# Patient Record
Sex: FEMALE | Race: WHITE | NOT HISPANIC OR LATINO | URBAN - METROPOLITAN AREA
[De-identification: names, ages, dates, MRNs, and addresses within clinical notes are randomized per-mention and may not be internally consistent; named-entity substitution may affect disease eponyms.]

---
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
## 2024-01-29 ENCOUNTER — TELEPHONE (OUTPATIENT)
Dept: OBGYN CLINIC | Facility: CLINIC | Age: 19
End: 2024-01-29

## 2024-02-23 ENCOUNTER — OFFICE VISIT (OUTPATIENT)
Dept: OBGYN CLINIC | Facility: CLINIC | Age: 19
End: 2024-02-23
Payer: COMMERCIAL

## 2024-02-23 VITALS
WEIGHT: 123.8 LBS | BODY MASS INDEX: 23.37 KG/M2 | DIASTOLIC BLOOD PRESSURE: 66 MMHG | HEIGHT: 61 IN | SYSTOLIC BLOOD PRESSURE: 112 MMHG

## 2024-02-23 DIAGNOSIS — Z11.3 SCREEN FOR STD (SEXUALLY TRANSMITTED DISEASE): ICD-10-CM

## 2024-02-23 DIAGNOSIS — N94.2 VAGINISMUS: Primary | ICD-10-CM

## 2024-02-23 PROBLEM — Z30.09 ENCOUNTER FOR COUNSELING REGARDING CONTRACEPTION: Status: RESOLVED | Noted: 2022-01-21 | Resolved: 2024-02-23

## 2024-02-23 PROCEDURE — 87491 CHLMYD TRACH DNA AMP PROBE: CPT | Performed by: PHYSICIAN ASSISTANT

## 2024-02-23 PROCEDURE — 87591 N.GONORRHOEAE DNA AMP PROB: CPT | Performed by: PHYSICIAN ASSISTANT

## 2024-02-23 PROCEDURE — 99214 OFFICE O/P EST MOD 30 MIN: CPT | Performed by: PHYSICIAN ASSISTANT

## 2024-02-23 RX ORDER — MIRTAZAPINE 15 MG/1
TABLET, FILM COATED ORAL
COMMUNITY
Start: 2023-12-18

## 2024-02-23 RX ORDER — SERTRALINE HYDROCHLORIDE 25 MG/1
25 TABLET, FILM COATED ORAL
COMMUNITY
Start: 2023-12-18

## 2024-02-23 NOTE — PROGRESS NOTES
Assessment/Plan:    No problem-specific Assessment & Plan notes found for this encounter.       Diagnoses and all orders for this visit:    Vaginismus  -     Ambulatory Referral to Physical Therapy; Future    Screen for STD (sexually transmitted disease)  -     Chlamydia/GC amplified DNA by PCR    Other orders  -     mirtazapine (REMERON) 15 mg tablet  -     sertraline (ZOLOFT) 25 mg tablet; 25 mg        GC/chlamydia screening done; we will call with results.  Suspect vaginismus.  Referral to pelvic health PT entered; patient given information.  She will call for appointment.  Continue condom use for STD prevention.  F/u in 6 mos for yearly gyn exam.  Call with problems in the interim.    Subjective:      Patient ID: Janey Beck is a 18 y.o. female.    Patient is here with complaint of pain with intercourse.  She first became sexually active a year ago, and it has been consistent since then.  Pain occurs on penetration and lasts for the entirety of intercourse.  She does not feel it is a lubrication issue.  There is no history of sexual trauma.  Periods are regular on her OCP and bleeding lasts for 4-5 days.  Has a history of severe dysmenorrhea that has resolved with OCP use.  Denies pelvic pain.  Patient also denies diarrhea, constipation, and urinary issues including leakage or incomplete bladder emptying.  She and her partner are using condoms; she has never had STD screening.        The following portions of the patient's history were reviewed and updated as appropriate: allergies, current medications, past family history, past medical history, past social history, past surgical history, and problem list.    Review of Systems   Gastrointestinal:  Negative for constipation and diarrhea.   Genitourinary:  Positive for dyspareunia. Negative for difficulty urinating, dysuria, frequency, genital sores, hematuria, menstrual problem, pelvic pain, urgency, vaginal bleeding, vaginal discharge and vaginal pain.      "    Objective:      /66 (BP Location: Left arm, Patient Position: Sitting, Cuff Size: Adult)   Ht 5' 0.5\" (1.537 m)   Wt 56.2 kg (123 lb 12.8 oz)   LMP 02/06/2024 (Approximate)   BMI 23.78 kg/m²          Physical Exam  Vitals reviewed. Exam conducted with a chaperone present.   Constitutional:       Appearance: Normal appearance. She is well-developed and normal weight.   Genitourinary:     General: Normal vulva.      Pubic Area: No rash.       Labia:         Right: No rash, tenderness, lesion or injury.         Left: No rash, tenderness, lesion or injury.       Vagina: Normal. No vaginal discharge, erythema, tenderness or bleeding.      Cervix: Normal.      Uterus: Normal.       Adnexa: Right adnexa normal and left adnexa normal.        Right: No mass, tenderness or fullness.          Left: No mass, tenderness or fullness.     Lymphadenopathy:      Lower Body: No right inguinal adenopathy. No left inguinal adenopathy.   Skin:     General: Skin is warm and dry.   Neurological:      Mental Status: She is alert and oriented to person, place, and time.   Psychiatric:         Mood and Affect: Mood normal.         Behavior: Behavior normal. Behavior is cooperative.         Thought Content: Thought content normal.         Judgment: Judgment normal.           "

## 2024-02-25 LAB
C TRACH DNA SPEC QL NAA+PROBE: NEGATIVE
N GONORRHOEA DNA SPEC QL NAA+PROBE: NEGATIVE

## 2024-03-13 ENCOUNTER — TELEPHONE (OUTPATIENT)
Age: 19
End: 2024-03-13

## 2024-03-13 NOTE — TELEPHONE ENCOUNTER
[facility] had called patient has an appointment with them on 3/18 and they would like the order for physical therapy faxed to them. Fax# [phone]

## 2024-03-15 NOTE — TELEPHONE ENCOUNTER
Patient called and she is going to go to PT out in [place].  She will be going to [facility] and she asked if you could please call them to get their fax number and if you can please fax the script to them. She has an appointment on 3/18/2024. There phone number is [phone].  Thank you

## 2024-03-24 DIAGNOSIS — Z30.41 SURVEILLANCE FOR BIRTH CONTROL, ORAL CONTRACEPTIVES: ICD-10-CM

## 2024-03-25 RX ORDER — DROSPIRENONE AND ETHINYL ESTRADIOL 0.03MG-3MG
1 KIT ORAL DAILY
Qty: 84 TABLET | Refills: 1 | Status: SHIPPED | OUTPATIENT
Start: 2024-03-25

## 2024-04-03 ENCOUNTER — TELEPHONE (OUTPATIENT)
Age: 19
End: 2024-04-03

## 2024-04-03 NOTE — TELEPHONE ENCOUNTER
Carin from [facility] called a plan of care was faxed to office , needs to be signed and faxed back to Fax #[phone]

## 2024-06-17 NOTE — TELEPHONE ENCOUNTER
"      Caitlin Rizvi is a 13 y.o. patient who presents for follow up of   Chief Complaint   Patient presents with    Follow-up    Menorrhagia         13-year-old established patient here for emergency appointment for vaginal bleeding.  She was seen as a new patient in May.  Her hemoglobin was down to 9.2 and she started taking daily iron and was up to 10.1 on 5/9/2024.  Her workup for thyroid and bleeding disorders were normal.  She was started on Janel 1.5/30.  She started about 13 May and then on May 30 started bleeding and bled until June 15th.  She called and we gave her a pill taper, taking 3 active pills for 3 days and then 2 active pills for 2 days and then daily to finish out the pack and skip placebo.  She did not stop bleeding until we did the taper dose.  She does not feel weak or dizzy.  She is on an iron supplement as well as a multivitamin.    The following portions of the patient's history were reviewed and updated as appropriate: allergies, current medications and problem list.    Review of Systems   Constitutional:  Positive for activity change.   Genitourinary:  Positive for menstrual problem.   Neurological:  Negative for dizziness and light-headedness.   Hematological:  Does not bruise/bleed easily.       /68   Ht 152.4 cm (60\")   Wt 49.4 kg (109 lb)   BMI 21.29 kg/m²     Physical Exam  Vitals and nursing note reviewed.   Constitutional:       Appearance: Normal appearance. She is well-developed and normal weight.   HENT:      Head: Normocephalic and atraumatic.   Eyes:      General: No scleral icterus.     Conjunctiva/sclera: Conjunctivae normal.   Neck:      Thyroid: No thyromegaly.   Abdominal:      General: There is no distension.      Palpations: Abdomen is soft. There is no mass.      Tenderness: There is no abdominal tenderness. There is no guarding or rebound.      Hernia: No hernia is present.   Skin:     General: Skin is warm and dry.   Neurological:      Mental Status: She is "
Patient will call back if she wants to cancel this appt, patient aware that we would prefer to see her in office as she is a new patient 
Patients mother LMOM, daughter has an appt on Thursday and was wondering if it can be a virtual visit? Patient is at school and having a hard time getting out of classes 
alert and oriented to person, place, and time.   Psychiatric:         Mood and Affect: Mood normal.         Behavior: Behavior normal.         Thought Content: Thought content normal.         Judgment: Judgment normal.         A/P:  1. AUB- cont OCPS and skip placebo.  2. CS- new refill of pills called in.   3. Anemia- check CBC today.C Continue iron and MVI daily.   4. RTO 7/11/2024 US and visit.     Assessment & Plan   Diagnoses and all orders for this visit:    1. Screening for genitourinary condition (Primary)  -     POC Urinalysis Dipstick  -     POC Pregnancy, Urine    2. Abnormal uterine bleeding (AUB)  -     CBC (No Diff)    3. Visit for oral contraceptive prescription    4. Iron deficiency anemia due to chronic blood loss    Other orders  -     norethindrone-ethinyl estradiol-iron (Junel FE 1.5/30) 1.5-30 MG-MCG tablet; Take 1 tablet by mouth Daily.  Dispense: 84 tablet; Refill: 3                 No follow-ups on file.      Pura Churchill MD    6/17/2024  09:31 EDT  
Passed

## 2024-10-28 DIAGNOSIS — Z30.41 SURVEILLANCE FOR BIRTH CONTROL, ORAL CONTRACEPTIVES: ICD-10-CM

## 2024-10-28 RX ORDER — DROSPIRENONE AND ETHINYL ESTRADIOL 0.03MG-3MG
1 KIT ORAL DAILY
Qty: 84 TABLET | Refills: 1 | Status: SHIPPED | OUTPATIENT
Start: 2024-10-28 | End: 2024-11-04 | Stop reason: SDUPTHER

## 2024-10-28 NOTE — TELEPHONE ENCOUNTER
Medication: drospirenone-ethinyl estradiol (MICHAEL) 3-0.03 MG per tablet     Dose/Frequency: TAKE 1 TABLET DAILY,     Quantity: 84    Pharmacy: [facility] [place] [facility]     Office:   [] PCP/Provider -   [x] Speciality/Provider -     Does the patient have enough for 3 days?   [x] Yes   [] No - Send as HP to POD

## 2024-11-04 DIAGNOSIS — Z30.41 SURVEILLANCE FOR BIRTH CONTROL, ORAL CONTRACEPTIVES: ICD-10-CM

## 2024-11-04 RX ORDER — DROSPIRENONE AND ETHINYL ESTRADIOL 0.03MG-3MG
1 KIT ORAL DAILY
Qty: 84 TABLET | Refills: 1 | Status: SHIPPED | OUTPATIENT
Start: 2024-11-04

## 2024-11-04 NOTE — TELEPHONE ENCOUNTER
**Refill request submitted 10/28 but filled at incorrect pharmacy. Please redirect refill to [facility]    Medication: drospirenone-ethinyl estradiol (MICHAEL) 3-0.03 MG per tablet      Dose/Frequency:  1 tablet, Oral, Daily      Quantity: 84     Pharmacy: [facility] - [place] - [facility]      Office:   [] PCP/Provider -   [x] Speciality/Provider -      Does the patient have enough for 3 days?   [x] Yes   [] No - Send as HP to POD

## 2024-12-13 NOTE — PROGRESS NOTES
Subjective      Janey Beck is a 19 y.o. female who presents for annual GYN exam.     GYN:  Menses are regular, q 28 days, lasting 5 days. Denies intermenstrual bleeding, or spotting.   Denies vaginal discharge, labial erythema or lesions, dyspareunia.  Contraception: pablo.  Patient is sexually active with  partner. Hx of vaginal pain; she did pelvic floor PT      OB:  OB History    Para Term  AB Living   0 0 0 0 0 0   SAB IAB Ectopic Multiple Live Births   0 0 0 0 0         :  Denies dysuria, urinary frequency or urgency.  Denies hematuria, flank pain, incontinence.    Breast:  Denies breast mass, skin changes, dimpling, reddening, nipple retraction.  Denies breast discharge.  Patient does  have a family history of breast, endometrial, colon, or ovarian ca.     Cancer-related family history includes Cancer in her maternal grandmother.    Past Medical History:   Diagnosis Date    Anemia 2004    Depression 2016       History reviewed. No pertinent surgical history.      General:    Work: student at [employer]   Safety: feels safe at home    Social History     Tobacco Use    Smoking status: Never    Smokeless tobacco: Never   Vaping Use    Vaping status: Never Used   Substance Use Topics    Alcohol use: Yes     Alcohol/week: 2.0 standard drinks of alcohol     Types: 2 Glasses of wine per week    Drug use: Never       Screening:  Cervical cancer: last pap smear in Not on file.   Breast cancer: last mammogram in Not on file.   Colon cancer: last colonoscopy in Not on file   STD screening: n/a.    Review of Systems   Constitutional:  Negative for fatigue.   Eyes:  Negative for photophobia and visual disturbance.   Respiratory:  Negative for cough and shortness of breath.    Cardiovascular:  Negative for chest pain and palpitations.   Gastrointestinal:  Negative for abdominal pain, blood in stool, constipation, diarrhea, nausea and rectal pain.   Genitourinary:  Negative for dyspareunia,  "dysuria, flank pain, frequency, genital sores, menstrual problem, pelvic pain, urgency, vaginal bleeding, vaginal discharge and vaginal pain.   Musculoskeletal:  Negative for arthralgias and back pain.   Skin:  Negative for rash.   Neurological:  Negative for weakness and headaches.          Objective      /62 (BP Location: Left arm, Patient Position: Sitting, Cuff Size: Standard)   Ht 5' 0.5\" (1.537 m)   Wt 55.8 kg (123 lb)   LMP 11/26/2024 (Approximate)   BMI 23.63 kg/m²   Physical Exam  Vitals and nursing note reviewed.   Constitutional:       General: She is not in acute distress.     Appearance: Normal appearance.   HENT:      Head: Normocephalic.   Eyes:      Conjunctiva/sclera: Conjunctivae normal.   Cardiovascular:      Rate and Rhythm: Normal rate and regular rhythm.      Heart sounds: Normal heart sounds.   Pulmonary:      Effort: Pulmonary effort is normal.      Breath sounds: Normal breath sounds.   Chest:      Comments: Declines breast exam.  Abdominal:      General: Abdomen is flat.      Palpations: Abdomen is soft.      Tenderness: There is no right CVA tenderness or left CVA tenderness.   Genitourinary:     Comments: Declines pelvic exam  Musculoskeletal:         General: Normal range of motion.      Cervical back: Normal range of motion.      Right lower leg: No edema.      Left lower leg: No edema.   Lymphadenopathy:      Cervical: No cervical adenopathy.   Skin:     General: Skin is warm and dry.   Neurological:      Mental Status: She is alert and oriented to person, place, and time.   Psychiatric:         Mood and Affect: Mood normal.         Behavior: Behavior normal.         Thought Content: Thought content normal.         Judgment: Judgment normal.                 Assessment/Plan  Problem List Items Addressed This Visit    None  Visit Diagnoses         Well woman exam    -  Primary      Surveillance for birth control, oral contraceptives        Relevant Medications    " drospirenone-ethinyl estradiol (MICHAEL) 3-0.03 MG per tablet            Declines GYN concerns today  Birth control: continue VINCENZO  Cervical cancer screening: at age 21  Breast Cancer screening: age 40  Colon cancer Screening: age 45  STD screening: declines  Reviewed healthy lifestyle and safe sex practices  RTO for annual exam or PRN      IMAN Morton  OB/GYN  12/16/2024  1:44 PM

## 2024-12-16 ENCOUNTER — ANNUAL EXAM (OUTPATIENT)
Dept: OBGYN CLINIC | Facility: CLINIC | Age: 19
End: 2024-12-16
Payer: COMMERCIAL

## 2024-12-16 VITALS
HEIGHT: 61 IN | WEIGHT: 123 LBS | SYSTOLIC BLOOD PRESSURE: 110 MMHG | DIASTOLIC BLOOD PRESSURE: 62 MMHG | BODY MASS INDEX: 23.22 KG/M2

## 2024-12-16 DIAGNOSIS — Z01.419 WELL WOMAN EXAM: Primary | ICD-10-CM

## 2024-12-16 DIAGNOSIS — Z30.41 SURVEILLANCE FOR BIRTH CONTROL, ORAL CONTRACEPTIVES: ICD-10-CM

## 2024-12-16 PROCEDURE — S0612 ANNUAL GYNECOLOGICAL EXAMINA: HCPCS

## 2024-12-16 RX ORDER — DROSPIRENONE AND ETHINYL ESTRADIOL 0.03MG-3MG
1 KIT ORAL DAILY
Qty: 84 TABLET | Refills: 3 | Status: SHIPPED | OUTPATIENT
Start: 2024-12-16

## 2025-01-27 NOTE — TELEPHONE ENCOUNTER
Called patient back, she is having pain during intercourse. It has been for the past year. It is every time she has intercourse. Denies bleeding. Scheduled for feb 23rd at 1030 as she preferred a Friday.   
Pt lmom no details left.   
Pt lmom, wanted to schedule an appointment.   
lmom  
Hypothyroid

## 2025-05-12 ENCOUNTER — OFFICE VISIT (OUTPATIENT)
Age: 20
End: 2025-05-12
Payer: COMMERCIAL

## 2025-05-12 VITALS
RESPIRATION RATE: 16 BRPM | WEIGHT: 112.8 LBS | HEART RATE: 102 BPM | HEIGHT: 61 IN | TEMPERATURE: 98.1 F | DIASTOLIC BLOOD PRESSURE: 62 MMHG | OXYGEN SATURATION: 99 % | SYSTOLIC BLOOD PRESSURE: 118 MMHG | BODY MASS INDEX: 21.3 KG/M2

## 2025-05-12 DIAGNOSIS — F41.1 GAD (GENERALIZED ANXIETY DISORDER): ICD-10-CM

## 2025-05-12 DIAGNOSIS — R56.9 WITNESSED SEIZURE-LIKE ACTIVITY (HCC): Primary | ICD-10-CM

## 2025-05-12 DIAGNOSIS — F32.5 MAJOR DEPRESSIVE DISORDER WITH SINGLE EPISODE, IN REMISSION (HCC): ICD-10-CM

## 2025-05-12 PROCEDURE — 99204 OFFICE O/P NEW MOD 45 MIN: CPT | Performed by: INTERNAL MEDICINE

## 2025-05-12 PROCEDURE — 93000 ELECTROCARDIOGRAM COMPLETE: CPT | Performed by: INTERNAL MEDICINE

## 2025-05-12 RX ORDER — DULOXETINE HYDROCHLORIDE 30 MG/1
30 CAPSULE, DELAYED RELEASE ORAL
COMMUNITY
Start: 2025-04-08 | End: 2025-05-22

## 2025-05-12 NOTE — ASSESSMENT & PLAN NOTE
Depression Screening Follow-up Plan: Patient's depression screening was positive with a PHQ-9 score of 5. Continue regular follow-up with their psychologist/therapist/psychiatrist who is managing their mental health condition(s).  Orders:  •  Vitamin D 25 hydroxy; Future  -agree with DC duloxetine  -follow up with Psychiatry

## 2025-05-12 NOTE — PROGRESS NOTES
Name: Janey Beck      : 2005      MRN: [id number]  Encounter Provider: Krystal Piña DO  Encounter Date: 2025   Encounter department: [facility] INTERNAL MEDICINE  :  Assessment & Plan  Witnessed seizure-like activity (HCC)  -brief seizure activity witnessed by her optometrist this morning during her exam, new  -followed by vomiting, recurred.  Advised to remain hydrated and drink broth, gatorade, etc  -rule out metabolic disorder  -obtain CTH  -of note, patient was started on duloxetine one month ago  -[place] form completed, advised no driving at this time  -refer to neuro   Orders:  •  Comprehensive metabolic panel; Future  •  CBC; Future  •  TSH, 3rd generation with Free T4 reflex; Future  •  Urinalysis with microscopic; Future  •  CT head wo contrast; Future  •  Ambulatory Referral to Neurology; Future  •  POCT ECG    Major depressive disorder with single episode, in remission (HCC)  Depression Screening Follow-up Plan: Patient's depression screening was positive with a PHQ-9 score of 5. Continue regular follow-up with their psychologist/therapist/psychiatrist who is managing their mental health condition(s).  Orders:  •  Vitamin D 25 hydroxy; Future  -agree with DC duloxetine  -follow up with Psychiatry  JAM (generalized anxiety disorder)  -DC duloxetine  -follow up with psychiatry              History of Present Illness   HPI  18yo female here as a new patient.  She is accompanied by her mother who provides some of the history.    This morning she went to the eye doctor and had a seizure.  She felt lightheaded and passed out.  Was at the office and eye doctor said she had a seizure.  Hands and arms were shaking, eyes were closed but saw them move back and forth.  Lasted for five seconds.   She woke up leaned over the chair, felt chest tightness and felt as if she could not get enough air in her brain.  She vomited twice and felt better afterwards.  No sick contacts, urinary  "incontinence or tongue biting noted.  She was started on cymbalta on 4/20/25 and was told to DC today.    She ate fruit and slept and rested.  She denies HA, dizziness    She has fainted previously typically at physician offices.  She gets anxious and has passed out.  Typically vomiting does not occur.    LMP two weeks ago.,    Review of Systems   Constitutional:  Negative for appetite change and fever.   HENT:  Negative for congestion, sinus pressure and sinus pain.    Eyes:  Negative for visual disturbance.   Respiratory:  Negative for shortness of breath and wheezing.    Cardiovascular:  Negative for chest pain and palpitations.   Gastrointestinal:  Positive for vomiting. Negative for abdominal pain, constipation, diarrhea and nausea.        Denies reflux   Genitourinary:  Positive for menstrual problem.   Neurological:  Negative for dizziness and headaches.   Psychiatric/Behavioral:  The patient is nervous/anxious.        Current Outpatient Medications:   •  Cymbalta 30 MG delayed release capsule, Take 30 mg by mouth, Disp: , Rfl:   •  drospirenone-ethinyl estradiol (MICHAEL) 3-0.03 MG per tablet, Take 1 tablet by mouth daily, Disp: 84 tablet, Rfl: 3    Objective   /62 (BP Location: Left arm, Patient Position: Sitting, Cuff Size: Standard)   Pulse 102   Temp 98.1 °F (36.7 °C)   Resp 16   Ht 5' 1\" (1.549 m)   Wt 51.2 kg (112 lb 12.8 oz)   SpO2 99%   BMI 21.31 kg/m²      Physical Exam  Vitals reviewed.   Constitutional:       General: She is not in acute distress.  Cardiovascular:      Rate and Rhythm: Regular rhythm. Tachycardia present.      Pulses: Normal pulses.      Heart sounds: Normal heart sounds.   Pulmonary:      Effort: Pulmonary effort is normal. No respiratory distress.      Breath sounds: Normal breath sounds. No wheezing.   Abdominal:      General: Abdomen is flat. Bowel sounds are normal. There is no distension.      Palpations: Abdomen is soft.      Tenderness: There is no abdominal " tenderness.   Musculoskeletal:      Cervical back: Neck supple. No tenderness.      Right lower leg: No edema.      Left lower leg: No edema.   Lymphadenopathy:      Cervical: No cervical adenopathy.   Skin:     Coloration: Skin is not pale.   Neurological:      Mental Status: She is alert and oriented to person, place, and time.      Cranial Nerves: No dysarthria or facial asymmetry.      Motor: No weakness or pronator drift.      Coordination: Heel to Shin Test normal.      Gait: Gait is intact.       EKG: NSR at 72bpm, normal axis, normal EKG.  No old EKG for comparison.

## 2025-05-13 ENCOUNTER — TELEPHONE (OUTPATIENT)
Age: 20
End: 2025-05-13

## 2025-05-13 LAB
25(OH)D3+25(OH)D2 SERPL-MCNC: 23 NG/ML (ref 30–100)
ALBUMIN SERPL-MCNC: 4.7 G/DL (ref 3.5–5.7)
ALP SERPL-CCNC: 49 U/L (ref 35–120)
ALT SERPL-CCNC: 9 U/L
ANION GAP SERPL CALCULATED.3IONS-SCNC: 12 MMOL/L (ref 3–11)
AST SERPL-CCNC: 14 U/L
BACTERIA URNS QL MICRO: ABNORMAL
BILIRUB SERPL-MCNC: 0.4 MG/DL (ref 0.2–1)
BUN SERPL-MCNC: 8 MG/DL (ref 7–25)
CALCIUM SERPL-MCNC: 10 MG/DL (ref 8.5–10.5)
CHLORIDE SERPL-SCNC: 100 MMOL/L (ref 100–109)
CO2 SERPL-SCNC: 26 MMOL/L (ref 21–31)
CREAT SERPL-MCNC: 0.64 MG/DL (ref 0.4–1.1)
CYTOLOGY CMNT CVX/VAG CYTO-IMP: ABNORMAL
ERYTHROCYTE [DISTWIDTH] IN BLOOD BY AUTOMATED COUNT: 13.6 % (ref 12–16)
GFR/BSA.PRED SERPLBLD CYS-BASED-ARV: 129 ML/MIN/{1.73_M2}
GLUCOSE SERPL-MCNC: 83 MG/DL (ref 65–99)
GLUCOSE UR QL STRIP: NEGATIVE MG/DL
HCT VFR BLD AUTO: 39.1 % (ref 35–43)
HGB BLD-MCNC: 13.7 G/DL (ref 11.5–14.5)
HGB UR QL STRIP: NEGATIVE MG/DL
KETONES UR QL STRIP: NEGATIVE MG/DL
LEUKOCYTE ESTERASE UR QL STRIP: 75 /UL
MCH RBC QN AUTO: 31.4 PG (ref 26–34)
MCHC RBC AUTO-ENTMCNC: 35 G/DL (ref 32–37)
MCV RBC AUTO: 90 FL (ref 80–100)
MUCOUS THREADS URNS QL MICRO: ABNORMAL
NITRITE UR QL STRIP: NEGATIVE
PH UR: 6.5 [PH] (ref 4.5–8)
PLATELET # BLD AUTO: 288 THOU/CMM (ref 140–350)
PMV BLD REES-ECKER: 7.3 FL (ref 7.5–11.3)
POTASSIUM SERPL-SCNC: 4.6 MMOL/L (ref 3.5–5.2)
PROT 24H UR-MRATE: NEGATIVE MG/DL
PROT SERPL-MCNC: 7.6 G/DL (ref 6.3–8.3)
RBC # BLD AUTO: 4.35 MILL/CMM (ref 3.7–4.7)
RBC #/AREA URNS HPF: 0 /HPF (ref 0–2)
SL AMB POCT URINE COMMENT: ABNORMAL
SODIUM SERPL-SCNC: 138 MMOL/L (ref 135–145)
SP GR UR: 1.01 (ref 1–1.03)
SQUAMOUS #/AREA URNS HPF: >10 /LPF (ref 0–5)
TSH SERPL-ACNC: 2.97 UIU/ML (ref 0.45–5.33)
WBC # BLD AUTO: 6.7 THOU/CMM (ref 4–10)
WBC #/AREA URNS HPF: 0 /HPF (ref 0–5)

## 2025-05-13 NOTE — TELEPHONE ENCOUNTER
[facility] CT scan of head:     \"No evidence of intracranial hemorrhage or mass.\" Noted that MRI is more sensitive and is recommended for further follow-up.     Please notify provider and further advise patient, as needed.    "

## 2025-05-13 NOTE — TELEPHONE ENCOUNTER
CT head wo contrast (Order [id number])   Fax [phone]    Reason for call:   [x] Prior Auth  [] Other:     Caller: [facility]     Ordering Provider:   [x] PCP/Provider - Dr Piña  [] Speciality/Provider -     Is the patient's insurance updated in EPIC?   [x] Yes   [] No     Is a copy of the patient's insurance scanned in EPIC?   [x] Yes   [] No

## 2025-05-22 ENCOUNTER — OFFICE VISIT (OUTPATIENT)
Age: 20
End: 2025-05-22
Payer: COMMERCIAL

## 2025-05-22 VITALS
OXYGEN SATURATION: 99 % | SYSTOLIC BLOOD PRESSURE: 102 MMHG | HEART RATE: 84 BPM | DIASTOLIC BLOOD PRESSURE: 68 MMHG | BODY MASS INDEX: 21.14 KG/M2 | TEMPERATURE: 97.3 F | HEIGHT: 61 IN | WEIGHT: 112 LBS | RESPIRATION RATE: 16 BRPM

## 2025-05-22 DIAGNOSIS — E55.9 VITAMIN D INSUFFICIENCY: ICD-10-CM

## 2025-05-22 DIAGNOSIS — Z11.4 SCREENING FOR HIV (HUMAN IMMUNODEFICIENCY VIRUS): ICD-10-CM

## 2025-05-22 DIAGNOSIS — Z00.00 ANNUAL PHYSICAL EXAM: Primary | ICD-10-CM

## 2025-05-22 DIAGNOSIS — Z11.59 NEED FOR HEPATITIS C SCREENING TEST: ICD-10-CM

## 2025-05-22 DIAGNOSIS — R56.9 WITNESSED SEIZURE-LIKE ACTIVITY (HCC): ICD-10-CM

## 2025-05-22 PROCEDURE — 99395 PREV VISIT EST AGE 18-39: CPT | Performed by: INTERNAL MEDICINE

## 2025-05-22 RX ORDER — VORTIOXETINE 5 MG/1
TABLET, FILM COATED ORAL
COMMUNITY
Start: 2025-05-17

## 2025-05-22 NOTE — ASSESSMENT & PLAN NOTE
-onset 5/12, brief episode while at optometrist appt, followed by multiple episodes of vomiting   -labs reviewed and unremarkable  -discontinued off cymbalta since  -CTH neg  -will be establishing with Neuro tomorrow  -not currently driving

## 2025-05-22 NOTE — PATIENT INSTRUCTIONS
"Patient Education     Routine physical for adults   The Basics   Written by the doctors and editors at [facility]   What is a physical? -- A physical is a routine visit, or \"check-up,\" with your doctor. You might also hear it called a \"wellness visit\" or \"preventive visit.\"  During each visit, the doctor will:   Ask about your physical and mental health   Ask about your habits, behaviors, and lifestyle   Do an exam   Give you vaccines if needed   Talk to you about any medicines you take   Give advice about your health   Answer your questions  Getting regular check-ups is an important part of taking care of your health. It can help your doctor find and treat any problems you have. But it's also important for preventing health problems.  A routine physical is different from a \"sick visit.\" A sick visit is when you see a doctor because of a health concern or problem. Since physicals are scheduled ahead of time, you can think about what you want to ask the doctor.  How often should I get a physical? -- It depends on your age and health. In general, for people age 21 years and older:   If you are younger than 50 years, you might be able to get a physical every 3 years.   If you are 50 years or older, your doctor might recommend a physical every year.  If you have an ongoing health condition, like diabetes or high blood pressure, your doctor will probably want to see you more often.  What happens during a physical? -- In general, each visit will include:   Physical exam - The doctor or nurse will check your height, weight, heart rate, and blood pressure. They will also look at your eyes and ears. They will ask about how you are feeling and whether you have any symptoms that bother you.   Medicines - It's a good idea to bring a list of all the medicines you take to each doctor visit. Your doctor will talk to you about your medicines and answer any questions. Tell them if you are having any side effects that bother you. You " "should also tell them if you are having trouble paying for any of your medicines.   Habits and behaviors - This includes:   Your diet   Your exercise habits   Whether you smoke, drink alcohol, or use drugs   Whether you are sexually active   Whether you feel safe at home  Your doctor will talk to you about things you can do to improve your health and lower your risk of health problems. They will also offer help and support. For example, if you want to quit smoking, they can give you advice and might prescribe medicines. If you want to improve your diet or get more physical activity, they can help you with this, too.   Lab tests, if needed - The tests you get will depend on your age and situation. For example, your doctor might want to check your:   Cholesterol   Blood sugar   Iron level   Vaccines - The recommended vaccines will depend on your age, health, and what vaccines you already had. Vaccines are very important because they can prevent certain serious or deadly infections.   Discussion of screening - \"Screening\" means checking for diseases or other health problems before they cause symptoms. Your doctor can recommend screening based on your age, risk, and preferences. This might include tests to check for:   Cancer, such as breast, prostate, cervical, ovarian, colorectal, prostate, lung, or skin cancer   Sexually transmitted infections, such as chlamydia and gonorrhea   Mental health conditions like depression and anxiety  Your doctor will talk to you about the different types of screening tests. They can help you decide which screenings to have. They can also explain what the results might mean.   Answering questions - The physical is a good time to ask the doctor or nurse questions about your health. If needed, they can refer you to other doctors or specialists, too.  Adults older than 65 years often need other care, too. As you get older, your doctor will talk to you about:   How to prevent falling at " home   Hearing or vision tests   Memory testing   How to take your medicines safely   Making sure that you have the help and support you need at home  All topics are updated as new evidence becomes available and our peer review process is complete.  This topic retrieved from [employer] on: May 02, 2024.  Topic 179878 Version 1.0  Release: 32.4.3 - C32.122  © 2024 UpToDate, Inc. and/or its affiliates. All rights reserved.  Consumer Information Use and Disclaimer   Disclaimer: This generalized information is a limited summary of diagnosis, treatment, and/or medication information. It is not meant to be comprehensive and should be used as a tool to help the user understand and/or assess potential diagnostic and treatment options. It does NOT include all information about conditions, treatments, medications, side effects, or risks that may apply to a specific patient. It is not intended to be medical advice or a substitute for the medical advice, diagnosis, or treatment of a health care provider based on the health care provider's examination and assessment of a patient's specific and unique circumstances. Patients must speak with a health care provider for complete information about their health, medical questions, and treatment options, including any risks or benefits regarding use of medications. This information does not endorse any treatments or medications as safe, effective, or approved for treating a specific patient. UpToDate, Inc. and its affiliates disclaim any warranty or liability relating to this information or the use thereof.The use of this information is governed by the Terms of Use, available at https://www.woltersNotizzauwer.com/en/know/clinical-effectiveness-terms. 2024© UpToDate, Inc. and its affiliates and/or licensors. All rights reserved.  Copyright   © 2024 UpToDate, Inc. and/or its affiliates. All rights reserved.

## 2025-05-22 NOTE — PROGRESS NOTES
Adult Annual Physical  Name: Janey Beck      : 2005      MRN: [id number]  Encounter Provider: Krystal Piña DO  Encounter Date: 2025   Encounter department: [facility] INTERNAL MEDICINE    :  Assessment & Plan  Annual physical exam         Screening for HIV (human immunodeficiency virus)    Orders:  •  HIV 1/2 AB/AG w Reflex [facility] for 2 yr old and above; Future    Need for hepatitis C screening test    Orders:  •  Hepatitis C antibody; Future    Witnessed seizure-like activity (HCC)  -onset , brief episode while at optometrist appt, followed by multiple episodes of vomiting   -labs reviewed and unremarkable  -discontinued off cymbalta since  -CT neg  -will be establishing with Neuro tomorrow  -not currently driving       Vitamin D insufficiency  -insufficient range at 23  -advised maintenance vitamin D3 at least 2000units daily           Preventive Screenings:  - Diabetes Screening: screening up-to-date  - Cholesterol Screening: screening not indicated   - Chlamydia Screening: screening up-to-date   - Hepatitis C screening: risks/benefits discussed and orders placed   - HIV screening: risks/benefits discussed and orders placed   - Cervical cancer screening: screening not indicated   - Colon cancer screening: screening not indicated   - Lung cancer screening: screening not indicated     Immunizations:  - Immunizations due: Tdap, HPV (Gardasil 9) and will need to obtain immunization records from Pediatrician  - Risks/benefits immunizations discussed    - The patient declines recommended vaccines currently despite my recommendations      Counseling/Anticipatory Guidance:    - Dental health: discussed importance of regular tooth brushing, flossing, and dental visits.   - Diet: discussed recommendations for a healthy/well-balanced diet.   - Exercise: the importance of regular exercise/physical activity was discussed. Recommend exercise 3-5 times per week for at least 30 minutes.  "         History of Present Illness     Adult Annual Physical:  Patient presents for annual physical. She was discontinued off cymbalta on 5/13, the day she had a seizure like activity.  Then started on trintellix 5mg four days later.  She reports no recurrence of seizure activity..     Diet and Physical Activity:  - Diet/Nutrition:. vegetarian since age of 7  - Exercise: walking.    General Health:  - Sleep: sleeps well.  - Vision: wears glasses and most recent eye exam < 1 year ago.  - Dental: regular dental visits.    /GYN Health:  - Follows with GYN: yes.   - Menopause: premenopausal.   - Contraception: oral contraceptives.      Advanced Care Planning:  - Has an advanced directive?: no    - Has a durable medical POA?: no      Review of Systems   Constitutional:  Negative for activity change and appetite change.   HENT:  Negative for congestion, postnasal drip and rhinorrhea.    Respiratory:  Negative for cough, shortness of breath and wheezing.    Cardiovascular:  Positive for palpitations. Negative for chest pain and leg swelling.   Gastrointestinal:  Negative for abdominal pain, constipation, diarrhea, nausea and vomiting.        Denies reflux   Genitourinary:  Negative for difficulty urinating.   Musculoskeletal:  Negative for arthralgias.   Neurological:  Negative for dizziness, tremors, weakness, numbness and headaches.   Psychiatric/Behavioral:  Negative for sleep disturbance. The patient is nervous/anxious.        Medical History Reviewed by provider this encounter:  Allergies     .  Medications Ordered Prior to Encounter[1]   Social History[2]    Objective   /68 (BP Location: Left arm, Patient Position: Sitting, Cuff Size: Standard)   Pulse 84   Temp (!) 97.3 °F (36.3 °C) (Tympanic Core)   Resp 16   Ht 5' 1\" (1.549 m)   Wt 50.8 kg (112 lb)   SpO2 99%   BMI 21.16 kg/m²     Physical Exam  Vitals reviewed.   Constitutional:       General: She is not in acute distress.     Appearance: She is " normal weight.   HENT:      Head: Normocephalic.      Right Ear: Tympanic membrane and ear canal normal.      Left Ear: Tympanic membrane and ear canal normal.      Nose: Nose normal.      Mouth/Throat:      Mouth: Mucous membranes are moist.     Eyes:      Extraocular Movements: Extraocular movements intact.      Conjunctiva/sclera: Conjunctivae normal.      Pupils: Pupils are equal, round, and reactive to light.      Comments: Wears glasses   Neck:      Thyroid: No thyromegaly or thyroid tenderness.     Cardiovascular:      Rate and Rhythm: Normal rate and regular rhythm.      Pulses: Normal pulses.      Heart sounds: Normal heart sounds.   Pulmonary:      Effort: Pulmonary effort is normal. No respiratory distress.      Breath sounds: Normal breath sounds. No wheezing.   Abdominal:      General: There is no distension.      Palpations: Abdomen is soft. There is no mass.      Tenderness: There is no abdominal tenderness.     Musculoskeletal:      Cervical back: Neck supple. No tenderness.      Right lower leg: No edema.      Left lower leg: No edema.   Lymphadenopathy:      Cervical: No cervical adenopathy.     Skin:     Coloration: Skin is not pale.     Neurological:      General: No focal deficit present.      Mental Status: She is alert and oriented to person, place, and time.     Psychiatric:         Mood and Affect: Mood normal.         Recent Results (from the past 3 weeks)   Comprehensive metabolic panel    Collection Time: 05/13/25 10:47 AM   Result Value Ref Range    Glucose, Random 83 65 - 99 mg/dL    BUN 8 7 - 25 mg/dL    Creatinine 0.64 0.40 - 1.10 mg/dL    Sodium 138 135 - 145 mmol/L    Potassium 4.6 3.5 - 5.2 mmol/L    Chloride 100 100 - 109 mmol/L    CO2 26 21 - 31 mmol/L    Calcium 10.0 8.5 - 10.5 mg/dL    Alkaline Phosphatase 49 35 - 120 U/L    Albumin 4.7 3.5 - 5.7 g/dL    TOTAL BILIRUBIN 0.4 0.2 - 1.0 mg/dL    Protein, Total 7.6 6.3 - 8.3 g/dL    AST 14 <41 U/L    ALT 9 <56 U/L    ANION GAP 12  (H) 3 - 11    eGFR 129 >59    Comment Interpretive information: calculated GFR    TSH, 3rd generation with Free T4 reflex    Collection Time: 05/13/25 10:47 AM   Result Value Ref Range    TSH 2.97 0.45 - 5.33 uIU/mL   CBC    Collection Time: 05/13/25 10:47 AM   Result Value Ref Range    Hemoglobin 13.7 11.5 - 14.5 g/dL    HCT 39.1 35.0 - 43.0 %    White Blood Cell Count 6.7 4.0 - 10.0 thou/cmm    Red Blood Cell Count 4.35 3.70 - 4.70 mill/cmm    Platelet Count 288 140 - 350 thou/cmm    SL AMB MPV 7.3 (L) 7.5 - 11.3 fL    MCV 90 80 - 100 fL    MCH 31.4 26.0 - 34.0 pg    MCHC 35.0 32.0 - 37.0 g/dL    RDW 13.6 12.0 - 16.0 %   Urinalysis with microscopic    Collection Time: 05/13/25 10:47 AM   Result Value Ref Range    Specific Gravity Ur 1.006 1.003 - 1.030    pH, Urine 6.5 4.5 - 8.0    Protein, Urine Negative Negative mg/dL    Glucose, Urine Negative Negative mg/dL    Ketone, Urine Negative Negative mg/dL    Blood, Urine Negative Negative mg/dL    Leukocyte Esterase 75 (A) Negative /uL    Nitrites Urine Negative Negative    POCT URINE COMMENT Not applicable     RBC, Urine 0 0 - 2 /hpf    SL AMB WBC, URINE 0 0 - 5 /hpf    Bacteria, Urine Few (A) Negative    MUCUS THREADS Few     Squamous Epithelial Cells >10 0 - 5 /lpf   Vitamin D 25 hydroxy    Collection Time: 05/13/25 10:47 AM   Result Value Ref Range    25-HYDROXY VIT D 23 (L) 30 - 100 ng/mL                [1]  Current Outpatient Medications on File Prior to Visit   Medication Sig Dispense Refill   • drospirenone-ethinyl estradiol (MICHAEL) 3-0.03 MG per tablet Take 1 tablet by mouth daily 84 tablet 3   • Trintellix 5 MG tablet      • [DISCONTINUED] Cymbalta 30 MG delayed release capsule Take 30 mg by mouth       No current facility-administered medications on file prior to visit.   [2]  Social History  Tobacco Use   • Smoking status: Never   • Smokeless tobacco: Never   Vaping Use   • Vaping status: Never Used   Substance and Sexual Activity   • Alcohol use: Yes      Alcohol/week: 2.0 standard drinks of alcohol     Types: 2 Glasses of wine per week   • Drug use: Never   • Sexual activity: Yes     Partners: Male     Birth control/protection: Coitus interruptus, Condom Male, OCP

## 2025-05-23 ENCOUNTER — CONSULT (OUTPATIENT)
Dept: NEUROLOGY | Facility: CLINIC | Age: 20
End: 2025-05-23
Attending: INTERNAL MEDICINE
Payer: COMMERCIAL

## 2025-05-23 VITALS
OXYGEN SATURATION: 99 % | BODY MASS INDEX: 21.3 KG/M2 | HEIGHT: 61 IN | HEART RATE: 90 BPM | SYSTOLIC BLOOD PRESSURE: 108 MMHG | DIASTOLIC BLOOD PRESSURE: 72 MMHG | WEIGHT: 112.8 LBS | TEMPERATURE: 95.9 F

## 2025-05-23 DIAGNOSIS — R56.9 WITNESSED SEIZURE-LIKE ACTIVITY (HCC): Primary | ICD-10-CM

## 2025-05-23 DIAGNOSIS — R55 SYNCOPE: ICD-10-CM

## 2025-05-23 PROCEDURE — 99204 OFFICE O/P NEW MOD 45 MIN: CPT | Performed by: PHYSICIAN ASSISTANT

## 2025-05-23 NOTE — ASSESSMENT & PLAN NOTE
"Ms. Beck is a 19 year old female with history of anxiety, depression and prior episodes of vasovagal syncope in the setting of medical office visits and procedures, who recently had an event of syncope with brief convulsive activity on 5/12/25 while in the middle of an eye exam at her optometrist's office.  Per report, she felt lightheaded and woozy like she was going to pass out, then briefly lost consciousness with witnessed shaking of her upper extremities x 5-10 seconds.  There was no tongue bite, incontinence, or prolonged postictal state.  She vomited afterwards.  PCP saw her later that day and ordered a stat CT head which was unremarkable.  Her neurologic exam is unremarkable.    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  The convulsive activity was only in her upper extremities and very brief, and there were no other concerns for seizure such as tongue injury, incontinence or postictal state.  However, she does have a family history of seizures, including in her father, which seems to have been diagnosed in his teens (patient reports he has been off AEDs for several years now with no recurrence).    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  Patient is in agreement.  There is no indication to initiate AED at this time.    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  She asked if this was \"okay\", and I told her that while it is not ideal (would prefer that she get the workup done now), " there really is nothing we can do about it since she is leaving in 3 days.  I suggested that she let her new roommates know about the recent event and ask them to keep an eye on her.  We discussed that if there are any events concerning for seizure, or any episodes of loss of consciousness, she should present to the emergency room where she is for full evaluation, and she is in complete agreement.      We did review seizure precautions today.  Her PCP sent a seizure reporting form to [facility] when she was seen, although patient has not received anything from them yet.  [facility] and should comply with no driving, at least until the workup is completed.    Plan:  - There is no indication to initiate antiseizure medication at this time  - MRI brain seizure protocol with and without contrast  - Routine EEG followed by 24-hour ambulatory EEG, if the routine is normal  - Patient to continue to not drive at this time, at least until the workup has been completed.  PCP already sent in [facility] form  - Patient should present to the ED if there are any further episodes of LOC or witnessed seizure-like activity  - Follow-up after additional workup has been completed    Orders:    EEG Routine and awake; Future    Ambulatory EEG 24 Hours; Standing    MRI brain seizure wo and w contrast; Future

## 2025-05-23 NOTE — PROGRESS NOTES
Name: Janey Beck      : 2005      MRN: [id number]  Encounter Provider: Gema Lopez PA-C  Encounter Date: 2025   Encounter department: [facility]  :  Assessment & Plan  Witnessed seizure-like activity (HCC)  Ms. Beck is a 19 year old female with history of anxiety, depression and prior episodes of vasovagal syncope in the setting of medical office visits and procedures, who recently had an event of syncope with brief convulsive activity on 25 while in the middle of an eye exam at her optometrist's office.  Per report, she felt lightheaded and woozy like she was going to pass out, then briefly lost consciousness with witnessed shaking of her upper extremities x 5-10 seconds.  There was no tongue bite, incontinence, or prolonged postictal state.  She vomited afterwards.  PCP saw her later that day and ordered a stat CT head which was unremarkable.  Her neurologic exam is unremarkable.    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  The convulsive activity was only in her upper extremities and very brief, and there were no other concerns for seizure such as tongue injury, incontinence or postictal state.  However, she does have a family history of seizures, including in her father, which seems to have been diagnosed in his teens (patient reports he has been off AEDs for several years now with no recurrence).    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  Patient is in agreement.  There is no indication to initiate AED at this time.    Unfortunately, patient is leaving on Monday  "(5/26/2025) for an internship in [place], and will be unable to complete any of her workup until she returns to [place] in August 2025.  She asked if this was \"okay\", and I told her that while it is not ideal (would prefer that she get the workup done now), there really is nothing we can do about it since she is leaving in 3 days.  I suggested that she let her new roommates know about the recent event and ask them to keep an eye on her.  We discussed that if there are any events concerning for seizure, or any episodes of loss of consciousness, she should present to the emergency room where she is for full evaluation, and she is in complete agreement.      We did review seizure precautions today.  Her PCP sent a seizure reporting form to [facility] when she was seen, although patient has not received anything from them yet.  She should wait to hear from [facility] and should comply with no driving, at least until the workup is completed.    Plan:  - There is no indication to initiate antiseizure medication at this time  - MRI brain seizure protocol with and without contrast  - Routine EEG followed by 24-hour ambulatory EEG, if the routine is normal  - Patient to continue to not drive at this time, at least until the workup has been completed.  PCP already sent in [employer] form  - Patient should present to the ED if there are any further episodes of LOC or witnessed seizure-like activity  - Follow-up after additional workup has been completed    Orders:    EEG Routine and awake; Future    Ambulatory EEG 24 Hours; Standing    MRI brain seizure wo and w contrast; Future    Syncope  As above, event more likely to have been vasovagal convulsive syncope, but will complete full seizure workup              History of Present Illness   HPI   Janey Beck is a 19 y.o. female with PMH of anxiety, depression, vasovagal syncope in the setting of medical procedures and office visits, who presents today for syncope with witnessed " "convulsive activity.    Patient referred by PCP for \"seizure like activity\" which occurred on 5/12/25.    Patient reports that she was at her eye doctor that day for a routine visit.  They were examining her and she started to feel woozy, lightheaded, and like she was going to pass out, and did.  Her eye doctor told her she passed out for about a min, and for 5-10 seconds had witnessed shaking of her hands and arms.  Also noted that her eyes were \"moving back and forth\".  When she came to, she felt like she had to vomit, and did x 2.  She says she felt better after she vomited.  She recalls brief confusion for less than 3 min, then back to baseline.  There was no incontinence, tongue biting.  She went to her PCP later that day and says she also vomited there.  She believes she had a banana and iced tea for breakfast and her appt was around noon when this happened.  She has a long history of vasovagal syncope anytime she gets shots, blood drawn, or even just at physician offices.  She tells me she once passed out in the hallway at her OBGYN office.   She has also passed out when ill before.  This is usually associated with anxiety and was not anxious that day.  She has felt fine since this incident.  She notes she had been started on Cymbalta by her psychiatrist about 2 weeks prior to this and then her psychiatrist took her off it when they learned of this event.  Her PCP ordered a STAT CTH, which she completed at [facility] on 5/13/25 and this was read as normal.  She was told not to drive and PCP apparently reported to [facility].    Patient denies history of seizures, just syncope as above.  She does have a family history of seizures in her father, who apparently had seizures diagnosed before age 18 and was on anti-seizure meds until 4-5 years ago when he stopped on his own and has not had any seizures since then.  Also apparently has a maternal aunt with seizures.  No prior head injuries, brain infections.  Normal birth " and development. No drug or alcohol use (social alcohol occasionally).    Patient reports that on  she is moving to [place] for the summer for an internship.  She says she will be unable to complete any work up until she is back in [place] in August.    AED/side effects/compliance:  None      Event/Seizure semiology:  - at optometry office; felt woozy, lightheaded like she was going to pass out.  Had LOC for less than 1 min with brief (5-10 sec) of shaking in arms witnessed by her eye doctor.  Returned to baseline after very brief 1-2 min of confusion.  No prolonged postictal state, tongue bite, incontinence.  Vomiting afterwards and later that day.   - history of syncope in the setting of anxiety, blood draws, shots, medical office visits, illness      Special Features  Status epilepticus: No  Self Injury Seizures: No  Precipitating Factors: No     Epilepsy Risk Factors:  Abnormal pregnancy: No  Abnormal birth/: No  Abnormal Development: No  Febrile seizures, simple: No  Febrile seizures, complex: No  CNS infection: No  Cerebral palsy: No  Head injury (moderate/severe): No  CNS neoplasm: No  CNS malformation: No  Neurosurgical procedure: No  Stroke: No  Alcohol abuse: No  Drug abuse: No  Family history Sz/epilepsy: Dad had seizures when he was younger, but is now off medication.  Possibly diagnosed prior to age 18.  Stopped meds 4-5 years ago and nothing since.  Maternal Aunt has history of seizures when she was younger, may or may not still be on medication     Prior AEDs:  None      Prior workup:    CTH wo contrast 25  Normal     Past psychiatric history:  Anxiety, depression  History of inpatient behavioral health admission  Psychiatrist is Dr. Imani Terrell [facility]  Sees a therapist too     Social history:  Lives with: parents, also lives out at college during school year  Occupation: student at [employer], studying electro [employer]  Driving: not currently, reporting form sent to  [employer] by PCP, awaiting letter   Alcohol socially, no drugs     Review of Systems   Constitutional: Negative.  Negative for fatigue and fever.   HENT: Negative.  Negative for hearing loss, tinnitus and trouble swallowing.    Eyes:  Negative for photophobia, pain and visual disturbance.   Respiratory: Negative.  Negative for cough and shortness of breath.    Cardiovascular: Negative.  Negative for chest pain and palpitations.   Gastrointestinal:  Negative for constipation, diarrhea, nausea and vomiting.   Endocrine: Negative.    Genitourinary: Negative.  Negative for difficulty urinating and urgency.   Musculoskeletal: Negative.  Negative for back pain, gait problem and neck pain.   Skin: Negative.  Negative for rash.   Neurological:  Positive for syncope. Negative for dizziness, tremors, seizures, speech difficulty, weakness, numbness and headaches.   Hematological: Negative.    Psychiatric/Behavioral:  Negative for decreased concentration and sleep disturbance. The patient is not nervous/anxious.     I have personally reviewed the MA's review of systems and made changes as necessary.    Medications Ordered Prior to Encounter[1]      Objective   /72 (Patient Position: Sitting, Cuff Size: Standard)   Pulse 90   Temp (!) 95.9 °F (35.5 °C) (Temporal)   Ht 5' 1\" (1.549 m)   Wt 51.2 kg (112 lb 12.8 oz)   SpO2 99%   BMI 21.31 kg/m²     Physical Exam  Constitutional:       Appearance: Normal appearance.     Eyes:      Extraocular Movements: EOM normal.      Pupils: Pupils are equal, round, and reactive to light.       Neurological:      Mental Status: She is alert.      Motor: Motor strength is normal.     Coordination: Coordination is intact.      Deep Tendon Reflexes:      Reflex Scores:       Bicep reflexes are 2+ on the right side and 2+ on the left side.       Brachioradialis reflexes are 2+ on the right side and 2+ on the left side.       Patellar reflexes are 3+ on the right side and 3+ on the left " side.       Achilles reflexes are 2+ on the right side and 2+ on the left side.    Psychiatric:         Mood and Affect: Mood normal.         Speech: Speech normal.         Behavior: Behavior normal.       Neurological Exam  Mental Status  Alert. Oriented to person, place, time and situation. Speech is normal. Language is fluent with no aphasia. Attention and concentration are normal.    Cranial Nerves  CN II: Visual fields full to confrontation.  CN III, IV, VI: Extraocular movements intact bilaterally. Pupils equal round and reactive to light bilaterally.  CN V: Facial sensation is normal.  CN VII: Full and symmetric facial movement.  CN VIII: Hearing is normal.  CN IX, X: Palate elevates symmetrically  CN XI: Shoulder shrug strength is normal.  CN XII: Tongue midline without atrophy or fasciculations.    Motor   Normal muscle tone. Strength is 5/5 throughout all four extremities.    Sensory  Light touch is normal in upper and lower extremities.     Reflexes                                            Right                      Left  Brachioradialis                    2+                         2+  Biceps                                 2+                         2+  Patellar                                3+                         3+  Achilles                                2+                         2+    Coordination    Finger-to-nose, rapid alternating movements and heel-to-shin normal bilaterally without dysmetria.    Gait  Casual gait is normal including stance, stride, and arm swing.           [1]   Current Outpatient Medications on File Prior to Visit   Medication Sig Dispense Refill    drospirenone-ethinyl estradiol (MICHAEL) 3-0.03 MG per tablet Take 1 tablet by mouth daily 84 tablet 3    Trintellix 5 MG tablet        No current facility-administered medications on file prior to visit.

## 2025-05-23 NOTE — PATIENT INSTRUCTIONS
Event sounds more like convulsive syncope (passing out with brief shaking activity), rather than seizure.  However, we should conduct a full workup, especially given family history.    MRI brain  -- call the office a few days to a week before your MRI and request that I send something in to relax you for the study   Routine EEG followed by a 24hr ambulatory EEG  If any recurrent seizure activity, would recommend evaluation in the ER   Follow up after additional studies     FIRST AID FOR SEIZURES    What to Do If You Witness a Seizure:     Generalized convulsive (called a generalized tonic-clonic or grand mal seizure). During this seizure, the person may cry out, suddenly stiffen up, make jerking movements, and fall.  The person may turn pale or blue from difficulty breathing.    Actions:  Stay calm. Talk in a soothing voice and if possible keep onlookers away.  Prevent injury. Move objects away that the person might hit while jerking uncontrollably.   Time when the seizure starts and ends. Most seizures stop after only a few minutes.  Turn him or her gently onto one side. This will help keep the airway clear.   Never place anything in his/her mouth or give him/her anything by mouth during a seizure.   -- Do not give the person water, pills, or food until fully alert.   Loosen tight clothing or jewelry around his/her neck.  Make the person as comfortable as possible.   Place something soft under their head.   Do not hold the person down. If the person having a seizure thrashes around there is no need for you to restrain them. They are more likely to be combative if restrained. Remember to consider your safety as well.   Keep onlookers away.   Be sensitive and supportive, and ask others to do the same.   Stay with the person until he/she is fully alert.    Complex partial seizure (confusional spells). During this kind of seizure, the person may have a glassy stare; give no response or inappropriate responses when  questioned; sit, stand, or walk about aimlessly; make lip smacking or chewing motions; fidget with clothes; appear to be drunk, drugged, or confused.    Actions:  Make sure the person is safe and won’t harm themselves.  Try to remove harmful objects from around the person (tools, utensils, glasses).  Do NOT be aggressive or attempt to restrain the person. They are more likely to be combative if restrained. Remember to consider your safety as well.  Help prevent the person from wandering, and direct the person to chair or safe position.  Never place anything in his/her mouth or give him/her anything by mouth during a seizure.   -- Do not give the person water, pills, or food until fully alert.   Keep onlookers away.   Be sensitive and supportive, and ask others to do the same.   Stay with the person until he/she is fully alert.    CALL 911 if:  A convulsive seizure lasts more than 5 minutes.  The person turns blue during the seizure.  The person does not start breathing after the seizure. Begin mouth to mouth resuscitation if this would occur.  The person has one seizure right after the other without coming back to normal consciousness between the seizures.  The person has not regained consciousness or is still confused after 30 minutes.  You know the person does not have epilepsy.  You know the person has diabetes or low blood sugar.  The person is pregnant, ill, or injured.   The seizure occurred in water, because the person may have inhaled water.  The person requests an ambulance or medical help.     Rescue medication  Your doctor may prescribe a rescue medication such as lorazepam (Ativan), diazepam (Valium / Diastat), or clonazepam (Klonopin) to terminate a seizure or if you have a history of cluster of seizures.  Follow the instructions given by your doctor for these medications    Recognizing Common Seizures (examples)   Simple partial seizures: Isolated twitching, numbness, sweating, dizziness,  nausea/vomiting, disturbances to hearing, vision, smell or taste. No loss of consciousness occurs, and the person remains aware of his/her environment.   Complex partial seizures: Staring, motionless, picking at clothes, smacking lips, swallowing repeatedly or wandering around. The person is not aware of their surroundings and is not fully responsive.  Atonic seizures: “Drop attacks” or sudden, rapid fall to ground with rapid recovery.   Myoclonic seizures: Brief forceful jerks which can affect the whole body or just part of it.   Absence seizures: May appear to be daydreaming or “spacing out.” The person is momentarily unresponsive and unaware of what is happening around him/her.   Tonic seizures: Stiffening of part or of the entire body.  Generalized Tonic-Clonic Seizures. “Grand-mal seizure.” Sudden loss of consciousness with body stiffening followed by continuous jerking movements. A blue tinge around the mouth is likely but lack of oxygen is rare. Loss of bladder and/or bowel control may occur.     SEIZURE SAFETY     Don’t let fear of seizures keep you at home. Be smart about your activities to make sure you are safe. The guidelines below can help you be as safe as possible.     Make sure the people around you are aware of:  What happens when you have a seizure  Correct seizure first aid  First aid for choking (consider taking a basic life support class)  When they should know to call 911 or for medical help    Avoid common triggers of seizures:  Missing your medications  Not getting enough sleep  Drinking alcohol  Using illegal drugs    Safety measures for at home:  In the bathroom:   Do not take baths in the bathtub. Instead, take only showers. Try to have a family member available while you are in the shower.  Make sure the drain in the bathtub/shower is working properly to avoid pooling of water.  You can consider a fitted shower seat. Recessed soap trays can minimize injury if you would happen to fall in  the shower.   Bathroom doors can be hung to open outwards, so that the door can still be opened if you fall against the door.   Do not lock the bathroom door. Use an “Occupied” sign on the door or other signal to let others know you are in the bathroom.  Safety locks can be obtained from the [employer].  On your water heater, set your water temperature to a warm temperature that is not scalding to avoid burns from very hot water.   Put non-skid strips/ in the bathtub.  Use an electric shaver.  Avoid any electrical appliances (including electric shaver) in the bathroom or near water.  Use shatterproof glass for mirrors.     In the kitchen:             When possible, cook using a microwave.  Only cook or use electrical appliances when someone else is in the house and available.   As much as possible, grill food and avoid fryers or frying food.  Use the back burners of the stove and turn the pot handles toward the back of the stove.  Avoid carrying hot pans, pots of boiling water, or very hot food. Serve food or liquids directly from the stove. At the least, minimize the distance hot food is carried.   Use precut foods or food processers to limit the need to use knives.   Use plastic or durable cups, dishes, and containers instead of breakable glass items.     In the bedroom  Low level and wide beds (like a futon) can reduce risk of injury of falling out of bed. If there is a high risk of falling out of bed, you can consider simply putting the mattress on the floor.  Avoid sleeping on top bunks of bunk beds.   Place a soft carpet on the floor.     Around the house  Pad sharp corners of tables, chairs, etc. Round tables and furniture can be considered to avoid sharp corners.   Avoid open flames. Place a screen in front of fireplaces and don’t build a fire alone.   Allow for open spaces with furniture.  Avoid loose throw rugs or slippery floors. Non-slip jacob or cushioned jacob can help reduce  injury from a fall.   Fireproof fabrics and furniture are best, and especially important if you smoke.  Doors and windows with safety glass are safer if someone falls against them.  Avoid lights and heaters that could easily be knocked over.  Use curling irons or clothing irons that have automatic shut off switches.  Make sure motor driven equipment or lawn mowers have “dead man’s” handles or seats so they will turn off if you release pressure.     Safety measures when away from home  Driving  [place] law mandates that you cannot drive for 6 months after your most recent seizure.   [place] law mandates that you cannot drive for 6 months after your most recent seizure.     Work/Travel  Wear a medical alert bracelet or necklace at all times.  Wear a helmet and use protective clothing/equipment when appropriate.  Consider telling co-workers and travel companions that you have epilepsy and what to do if you have a seizure.  Avoid irregular shifts or disruptions of a regular sleep pattern.  Take your medications on time and keep an updated list of medications in your purse or wallet.  Do not climb to heights or operate heavy machinery.  Stand back from the edges of roads or bus/train platforms when traveling.  If you wander when you have a seizure, take a friend along for the trip.     Recreation  Swimming can be dangerous. Do not swim alone, in open water, or in murky water that you cannot see the bottom.   Caregivers should be with you in the pool at all times and must be physically able to get you out of the water.  Use a flotation device.   Scuba diving is not recommended since during a seizure people are unaware of their surroundings.  Having a seizure underwater can be deadly and can endanger the lives of others.  Kayaking and canoeing can be especially dangerous  People with epilepsy are at a higher risk of becoming trapped underneath a canoe or kayak.  Wear a helmet when playing contact sports, biking, or  if there is a risk of falling.  Patients with epilepsy are at a higher risk of head injury when playing contact sports.  Avoid riding a bike, running, or other activities around busy roads, steep hills, or secluded areas.   Exercise on soft surfaces.  Theme Vital: many people with epilepsy can go on rides depending on their type of seizures.  For some people, stress or excitement can trigger a seizure.   Rollercoasters (especially if you are upside-down) are more dangerous for people with epilepsy.        Medications  Take your medications on time. If you have trouble remembering, set alarms on your phone. You can visit [email or web address].com to set up reminders through text message to help you remember to take your medications.  Use a pillbox to help you keep track of your medications.  When out of the house, take any needed medications with you. Consider keeping one or two extra doses with you in case you are unexpectedly away from home longer than planned.  If you realize you missed a dose of your medications and it is less than 2 hours until your next dose, skip the missed dose. Do not double up your medication dose. If it is more than 2 hours until your next dose, you can take the missed dose.   Avoid drinking alcohol, since this can enhance effects of your seizure medications.  Be aware of common and major side effects of your medications.  Keep your medications out of the reach of children.     Parenting:  Childproof your home as much as possible.  It is possible that you could drop your baby if you have a seizure while holding or feeding them.  If you are nursing a baby, sit on the floor or bed with your back supported so the baby will not fall far if you should lose consciousness.  Feed the baby while he or she is seated in an infant seat.  Dress, change, and sponge bathe the baby on the floor.  Move the baby around in a stroller or small crib.  Keep a young baby in a playpen when you are alone, and a  toddler in an indoor play yard, or childproof one room and use safety brasher at the doors.  When out of the house, use a bungee-type cord or restraint harness so your child cannot wander away if you have a seizure that affects your awareness.

## 2025-06-06 ENCOUNTER — TELEPHONE (OUTPATIENT)
Age: 20
End: 2025-06-06

## 2025-06-06 NOTE — TELEPHONE ENCOUNTER
Patient's mother called stating that the insurance company will need prior authorization for her daughters EEG and MRI. Could you please start prior authorization for the patient?     Thank you,  Arti

## 2025-06-27 ENCOUNTER — TELEPHONE (OUTPATIENT)
Age: 20
End: 2025-06-27

## 2025-06-27 NOTE — TELEPHONE ENCOUNTER
Warm transfer from [facility] with patient's mother Licha on the phone. Licha had multiple questions regarding EEGs MRI, etc. Discussed the differences between a routine EEG and AMB 24 Hours EEG. Confirmed CPT codes for both.     Explained an MRI test and what it involves. Mother stated patient passes out with needles but wakes up within 5 seconds. Advised Licha to have patient tell the person performing the MRI ahead of time.     Licha was very concerned with the cost of these tests. Warmed transferred patient to  for further assistance. Patient was appreciative of my assistance.

## 2025-08-04 ENCOUNTER — HOSPITAL ENCOUNTER (OUTPATIENT)
Dept: NEUROLOGY | Facility: CLINIC | Age: 20
Discharge: HOME | End: 2025-08-04
Attending: PHYSICIAN ASSISTANT
Payer: COMMERCIAL

## 2025-08-04 DIAGNOSIS — R56.9 WITNESSED SEIZURE-LIKE ACTIVITY (HCC): ICD-10-CM

## 2025-08-04 PROCEDURE — 95816 EEG AWAKE AND DROWSY: CPT

## 2025-08-04 PROCEDURE — 95816 EEG AWAKE AND DROWSY: CPT | Performed by: PSYCHIATRY & NEUROLOGY

## 2025-08-11 ENCOUNTER — HOSPITAL ENCOUNTER (OUTPATIENT)
Dept: RADIOLOGY | Age: 20
Discharge: HOME | End: 2025-08-11
Attending: PHYSICIAN ASSISTANT
Payer: COMMERCIAL

## 2025-08-12 ENCOUNTER — HOSPITAL ENCOUNTER (OUTPATIENT)
Dept: NEUROLOGY | Facility: CLINIC | Age: 20
Discharge: HOME | End: 2025-08-12
Attending: PHYSICIAN ASSISTANT

## 2025-08-13 ENCOUNTER — HOSPITAL ENCOUNTER (OUTPATIENT)
Dept: NEUROLOGY | Facility: CLINIC | Age: 20
Discharge: HOME | End: 2025-08-13
Attending: PHYSICIAN ASSISTANT
Payer: COMMERCIAL

## 2025-08-13 DIAGNOSIS — R56.9 WITNESSED SEIZURE-LIKE ACTIVITY (HCC): ICD-10-CM

## 2025-08-13 PROCEDURE — 95719 EEG PHYS/QHP EA INCR W/O VID: CPT | Performed by: STUDENT IN AN ORGANIZED HEALTH CARE EDUCATION/TRAINING PROGRAM

## 2025-08-13 PROCEDURE — 95708 EEG WO VID EA 12-26HR UNMNTR: CPT

## 2025-08-15 ENCOUNTER — TELEPHONE (OUTPATIENT)
Age: 20
End: 2025-08-15

## 2025-08-22 ENCOUNTER — TELEPHONE (OUTPATIENT)
Dept: NEUROLOGY | Facility: CLINIC | Age: 20
End: 2025-08-22